# Patient Record
Sex: MALE | URBAN - METROPOLITAN AREA
[De-identification: names, ages, dates, MRNs, and addresses within clinical notes are randomized per-mention and may not be internally consistent; named-entity substitution may affect disease eponyms.]

---

## 2018-01-01 ENCOUNTER — HOSPITAL ENCOUNTER (INPATIENT)
Facility: MEDICAL CENTER | Age: 68
LOS: 1 days | DRG: 871 | End: 2018-05-17
Attending: EMERGENCY MEDICINE | Admitting: INTERNAL MEDICINE
Payer: MEDICARE

## 2018-01-01 ENCOUNTER — APPOINTMENT (OUTPATIENT)
Dept: RADIOLOGY | Facility: MEDICAL CENTER | Age: 68
DRG: 871 | End: 2018-01-01
Attending: EMERGENCY MEDICINE
Payer: MEDICARE

## 2018-01-01 VITALS — BODY MASS INDEX: 22.32 KG/M2 | HEIGHT: 66 IN | TEMPERATURE: 93.7 F | WEIGHT: 138.89 LBS

## 2018-01-01 DIAGNOSIS — E16.2 HYPOGLYCEMIA: ICD-10-CM

## 2018-01-01 DIAGNOSIS — N17.9 ACUTE RENAL FAILURE, UNSPECIFIED ACUTE RENAL FAILURE TYPE (HCC): ICD-10-CM

## 2018-01-01 DIAGNOSIS — D72.825 BANDEMIA: ICD-10-CM

## 2018-01-01 DIAGNOSIS — D68.9 COAGULOPATHY (HCC): ICD-10-CM

## 2018-01-01 DIAGNOSIS — E87.20 METABOLIC ACIDOSIS: ICD-10-CM

## 2018-01-01 DIAGNOSIS — I21.4 NSTEMI (NON-ST ELEVATED MYOCARDIAL INFARCTION) (HCC): ICD-10-CM

## 2018-01-01 DIAGNOSIS — R57.9 SHOCK (HCC): ICD-10-CM

## 2018-01-01 DIAGNOSIS — K92.2 GASTROINTESTINAL HEMORRHAGE, UNSPECIFIED GASTROINTESTINAL HEMORRHAGE TYPE: ICD-10-CM

## 2018-01-01 DIAGNOSIS — E87.20 LACTIC ACIDOSIS: ICD-10-CM

## 2018-01-01 DIAGNOSIS — J96.01 ACUTE RESPIRATORY FAILURE WITH HYPOXIA (HCC): ICD-10-CM

## 2018-01-01 LAB
ABO GROUP BLD: NORMAL
ABO GROUP BLD: NORMAL
ACTION RANGE TRIGGERED IACRT: YES
ACTION RANGE TRIGGERED IACRT: YES
ALBUMIN SERPL BCP-MCNC: 2.5 G/DL (ref 3.2–4.9)
ALBUMIN/GLOB SERPL: 1 G/DL
ALP SERPL-CCNC: 134 U/L (ref 30–99)
ALT SERPL-CCNC: 802 U/L (ref 2–50)
AMMONIA PLAS-SCNC: 239 UMOL/L (ref 11–45)
AMPHET UR QL SCN: NEGATIVE
ANION GAP SERPL CALC-SCNC: 24 MMOL/L (ref 0–11.9)
ANISOCYTOSIS BLD QL SMEAR: ABNORMAL
APAP SERPL-MCNC: <10 UG/ML (ref 10–30)
APPEARANCE UR: ABNORMAL
APTT PPP: 47.9 SEC (ref 24.7–36)
AST SERPL-CCNC: 1713 U/L (ref 12–45)
BACTERIA #/AREA URNS HPF: ABNORMAL /HPF
BARBITURATES UR QL SCN: NEGATIVE
BARCODED ABORH UBTYP: 5100
BARCODED ABORH UBTYP: 6200
BARCODED ABORH UBTYP: 6200
BARCODED PRD CODE UBPRD: NORMAL
BARCODED UNIT NUM UBUNT: NORMAL
BASE EXCESS BLDA CALC-SCNC: -15 MMOL/L (ref -4–3)
BASE EXCESS BLDA CALC-SCNC: -20 MMOL/L (ref -4–3)
BASOPHILS # BLD AUTO: 0.9 % (ref 0–1.8)
BASOPHILS # BLD: 0.21 K/UL (ref 0–0.12)
BENZODIAZ UR QL SCN: NEGATIVE
BILIRUB SERPL-MCNC: 1.6 MG/DL (ref 0.1–1.5)
BILIRUB UR QL STRIP.AUTO: ABNORMAL
BLD GP AB SCN SERPL QL: NORMAL
BODY TEMPERATURE: ABNORMAL DEGREES
BODY TEMPERATURE: ABNORMAL DEGREES
BUN SERPL-MCNC: 47 MG/DL (ref 8–22)
BURR CELLS BLD QL SMEAR: NORMAL
BZE UR QL SCN: NEGATIVE
CALCIUM SERPL-MCNC: 7.3 MG/DL (ref 8.5–10.5)
CANNABINOIDS UR QL SCN: NEGATIVE
CFT BLD TEG: 6.8 MIN (ref 5–10)
CHLORIDE SERPL-SCNC: 96 MMOL/L (ref 96–112)
CLOT ANGLE BLD TEG: 59.2 DEGREES (ref 53–72)
CLOT LYSIS 30M P MA LENFR BLD TEG: 0 % (ref 0–8)
CO2 BLDA-SCNC: 13 MMOL/L (ref 20–33)
CO2 BLDA-SCNC: 15 MMOL/L (ref 20–33)
CO2 SERPL-SCNC: 14 MMOL/L (ref 20–33)
COLOR UR: YELLOW
COMPONENT FT 8504FT: NORMAL
COMPONENT FT 8504FT: NORMAL
COMPONENT R 8504R: NORMAL
CREAT SERPL-MCNC: 2.3 MG/DL (ref 0.5–1.4)
CT.EXTRINSIC BLD ROTEM: 2.3 MIN (ref 1–3)
EKG IMPRESSION: NORMAL
EKG IMPRESSION: NORMAL
EOSINOPHIL # BLD AUTO: 0 K/UL (ref 0–0.51)
EOSINOPHIL NFR BLD: 0 % (ref 0–6.9)
EPI CELLS #/AREA URNS HPF: ABNORMAL /HPF
ERYTHROCYTE [DISTWIDTH] IN BLOOD BY AUTOMATED COUNT: 54.6 FL (ref 35.9–50)
ETHANOL BLD-MCNC: 0 G/DL
GLOBULIN SER CALC-MCNC: 2.5 G/DL (ref 1.9–3.5)
GLUCOSE BLD-MCNC: 170 MG/DL (ref 65–99)
GLUCOSE BLD-MCNC: 211 MG/DL (ref 65–99)
GLUCOSE BLD-MCNC: 41 MG/DL (ref 65–99)
GLUCOSE SERPL-MCNC: 41 MG/DL (ref 65–99)
GLUCOSE UR STRIP.AUTO-MCNC: NEGATIVE MG/DL
HCO3 BLDA-SCNC: 11.2 MMOL/L (ref 17–25)
HCO3 BLDA-SCNC: 13.4 MMOL/L (ref 17–25)
HCT VFR BLD AUTO: 28.2 % (ref 42–52)
HGB BLD-MCNC: 8.7 G/DL (ref 14–18)
HYALINE CASTS #/AREA URNS LPF: ABNORMAL /LPF
INR PPP: 2.68 (ref 0.87–1.13)
INST. QUALIFIED PATIENT IIQPT: YES
INST. QUALIFIED PATIENT IIQPT: YES
KETONES UR STRIP.AUTO-MCNC: ABNORMAL MG/DL
LACTATE BLD-SCNC: 13.7 MMOL/L (ref 0.5–2)
LEUKOCYTE ESTERASE UR QL STRIP.AUTO: NEGATIVE
LIPASE SERPL-CCNC: 154 U/L (ref 11–82)
LYMPHOCYTES # BLD AUTO: 2.41 K/UL (ref 1–4.8)
LYMPHOCYTES NFR BLD: 10.4 % (ref 22–41)
MACROCYTES BLD QL SMEAR: ABNORMAL
MAGNESIUM SERPL-MCNC: 2.5 MG/DL (ref 1.5–2.5)
MANUAL DIFF BLD: ABNORMAL
MCF BLD TEG: 60.2 MM (ref 50–70)
MCH RBC QN AUTO: 28.9 PG (ref 27–33)
MCHC RBC AUTO-ENTMCNC: 30.9 G/DL (ref 33.7–35.3)
MCV RBC AUTO: 93.7 FL (ref 81.4–97.8)
METAMYELOCYTES NFR BLD MANUAL: 0.9 %
METHADONE UR QL SCN: NEGATIVE
MICRO URNS: ABNORMAL
MONOCYTES # BLD AUTO: 1.6 K/UL (ref 0–0.85)
MONOCYTES NFR BLD AUTO: 6.9 % (ref 0–13.4)
MORPHOLOGY BLD-IMP: NORMAL
NEUTROPHILS # BLD AUTO: 18.56 K/UL (ref 1.82–7.42)
NEUTROPHILS NFR BLD: 65.2 % (ref 44–72)
NEUTS BAND NFR BLD MANUAL: 14.8 % (ref 0–10)
NITRITE UR QL STRIP.AUTO: NEGATIVE
NRBC # BLD AUTO: 0.07 K/UL
NRBC BLD-RTO: 0.3 /100 WBC
O2/TOTAL GAS SETTING VFR VENT: 100 %
O2/TOTAL GAS SETTING VFR VENT: 50 %
OPIATES UR QL SCN: NEGATIVE
OXYCODONE UR QL SCN: NEGATIVE
PA AA BLD-ACNC: 97.1 %
PA ADP BLD-ACNC: 99.8 %
PCO2 BLDA: 40 MMHG (ref 26–37)
PCO2 BLDA: 52.6 MMHG (ref 26–37)
PCO2 TEMP ADJ BLDA: 36.2 MMHG (ref 26–37)
PCO2 TEMP ADJ BLDA: 50.8 MMHG (ref 26–37)
PCP UR QL SCN: NEGATIVE
PH BLDA: 6.94 [PH] (ref 7.4–7.5)
PH BLDA: 7.13 [PH] (ref 7.4–7.5)
PH TEMP ADJ BLDA: 6.95 [PH] (ref 7.4–7.5)
PH TEMP ADJ BLDA: 7.16 [PH] (ref 7.4–7.5)
PH UR STRIP.AUTO: 5.5 [PH]
PLATELET # BLD AUTO: 165 K/UL (ref 164–446)
PLATELET BLD QL SMEAR: NORMAL
PMV BLD AUTO: 10.9 FL (ref 9–12.9)
PO2 BLDA: 237 MMHG (ref 64–87)
PO2 BLDA: 375 MMHG (ref 64–87)
PO2 TEMP ADJ BLDA: 226 MMHG (ref 64–87)
PO2 TEMP ADJ BLDA: 371 MMHG (ref 64–87)
POIKILOCYTOSIS BLD QL SMEAR: NORMAL
POTASSIUM SERPL-SCNC: 5.7 MMOL/L (ref 3.6–5.5)
PRODUCT TYPE UPROD: NORMAL
PROMYELOCYTES NFR BLD MANUAL: 0.9 %
PROPOXYPH UR QL SCN: NEGATIVE
PROT SERPL-MCNC: 5 G/DL (ref 6–8.2)
PROT UR QL STRIP: 30 MG/DL
PROTHROMBIN TIME: 28.2 SEC (ref 12–14.6)
RBC # BLD AUTO: 3.01 M/UL (ref 4.7–6.1)
RBC BLD AUTO: PRESENT
RBC UR QL AUTO: ABNORMAL
RH BLD: NORMAL
RH BLD: NORMAL
SALICYLATES SERPL-MCNC: 0 MG/DL (ref 15–25)
SAO2 % BLDA: 100 % (ref 93–99)
SAO2 % BLDA: 100 % (ref 93–99)
SODIUM SERPL-SCNC: 134 MMOL/L (ref 135–145)
SP GR UR REFRACTOMETRY: 1.01
SPECIMEN DRAWN FROM PATIENT: ABNORMAL
SPECIMEN DRAWN FROM PATIENT: ABNORMAL
TEG ALGORITHM TGALG: NORMAL
TROPONIN I SERPL-MCNC: 0.71 NG/ML (ref 0–0.04)
UNIT STATUS USTAT: NORMAL
UROBILINOGEN UR STRIP.AUTO-MCNC: 0.2 MG/DL
WBC # BLD AUTO: 23.2 K/UL (ref 4.8–10.8)

## 2018-01-01 PROCEDURE — 700111 HCHG RX REV CODE 636 W/ 250 OVERRIDE (IP)

## 2018-01-01 PROCEDURE — 85730 THROMBOPLASTIN TIME PARTIAL: CPT

## 2018-01-01 PROCEDURE — 36415 COLL VENOUS BLD VENIPUNCTURE: CPT

## 2018-01-01 PROCEDURE — 85384 FIBRINOGEN ACTIVITY: CPT

## 2018-01-01 PROCEDURE — 83735 ASSAY OF MAGNESIUM: CPT

## 2018-01-01 PROCEDURE — 700111 HCHG RX REV CODE 636 W/ 250 OVERRIDE (IP): Performed by: INTERNAL MEDICINE

## 2018-01-01 PROCEDURE — C1751 CATH, INF, PER/CENT/MIDLINE: HCPCS

## 2018-01-01 PROCEDURE — 36556 INSERT NON-TUNNEL CV CATH: CPT

## 2018-01-01 PROCEDURE — 700105 HCHG RX REV CODE 258: Performed by: EMERGENCY MEDICINE

## 2018-01-01 PROCEDURE — 99292 CRITICAL CARE ADDL 30 MIN: CPT

## 2018-01-01 PROCEDURE — 82962 GLUCOSE BLOOD TEST: CPT | Mod: 91

## 2018-01-01 PROCEDURE — 96368 THER/DIAG CONCURRENT INF: CPT

## 2018-01-01 PROCEDURE — 99291 CRITICAL CARE FIRST HOUR: CPT

## 2018-01-01 PROCEDURE — 86850 RBC ANTIBODY SCREEN: CPT

## 2018-01-01 PROCEDURE — 96375 TX/PRO/DX INJ NEW DRUG ADDON: CPT

## 2018-01-01 PROCEDURE — 86901 BLOOD TYPING SEROLOGIC RH(D): CPT

## 2018-01-01 PROCEDURE — 02HV33Z INSERTION OF INFUSION DEVICE INTO SUPERIOR VENA CAVA, PERCUTANEOUS APPROACH: ICD-10-PCS | Performed by: EMERGENCY MEDICINE

## 2018-01-01 PROCEDURE — 304538 HCHG NG TUBE

## 2018-01-01 PROCEDURE — 700101 HCHG RX REV CODE 250

## 2018-01-01 PROCEDURE — 82803 BLOOD GASES ANY COMBINATION: CPT | Mod: 91

## 2018-01-01 PROCEDURE — 93005 ELECTROCARDIOGRAM TRACING: CPT

## 2018-01-01 PROCEDURE — C9113 INJ PANTOPRAZOLE SODIUM, VIA: HCPCS | Performed by: EMERGENCY MEDICINE

## 2018-01-01 PROCEDURE — 700105 HCHG RX REV CODE 258: Performed by: INTERNAL MEDICINE

## 2018-01-01 PROCEDURE — 96366 THER/PROPH/DIAG IV INF ADDON: CPT

## 2018-01-01 PROCEDURE — P9017 PLASMA 1 DONOR FRZ W/IN 8 HR: HCPCS

## 2018-01-01 PROCEDURE — 5A1935Z RESPIRATORY VENTILATION, LESS THAN 24 CONSECUTIVE HOURS: ICD-10-PCS | Performed by: EMERGENCY MEDICINE

## 2018-01-01 PROCEDURE — 85007 BL SMEAR W/DIFF WBC COUNT: CPT

## 2018-01-01 PROCEDURE — 85576 BLOOD PLATELET AGGREGATION: CPT | Mod: 91

## 2018-01-01 PROCEDURE — 85027 COMPLETE CBC AUTOMATED: CPT

## 2018-01-01 PROCEDURE — 85610 PROTHROMBIN TIME: CPT

## 2018-01-01 PROCEDURE — 37799 UNLISTED PX VASCULAR SURGERY: CPT

## 2018-01-01 PROCEDURE — 81001 URINALYSIS AUTO W/SCOPE: CPT

## 2018-01-01 PROCEDURE — 82140 ASSAY OF AMMONIA: CPT

## 2018-01-01 PROCEDURE — 84484 ASSAY OF TROPONIN QUANT: CPT

## 2018-01-01 PROCEDURE — B548ZZA ULTRASONOGRAPHY OF SUPERIOR VENA CAVA, GUIDANCE: ICD-10-PCS | Performed by: EMERGENCY MEDICINE

## 2018-01-01 PROCEDURE — 700101 HCHG RX REV CODE 250: Performed by: EMERGENCY MEDICINE

## 2018-01-01 PROCEDURE — 303105 HCHG CATHETER EXTRA

## 2018-01-01 PROCEDURE — 94760 N-INVAS EAR/PLS OXIMETRY 1: CPT

## 2018-01-01 PROCEDURE — 700111 HCHG RX REV CODE 636 W/ 250 OVERRIDE (IP): Performed by: EMERGENCY MEDICINE

## 2018-01-01 PROCEDURE — 36620 INSERTION CATHETER ARTERY: CPT

## 2018-01-01 PROCEDURE — 87040 BLOOD CULTURE FOR BACTERIA: CPT | Mod: 91

## 2018-01-01 PROCEDURE — 96367 TX/PROPH/DG ADDL SEQ IV INF: CPT

## 2018-01-01 PROCEDURE — 51702 INSERT TEMP BLADDER CATH: CPT

## 2018-01-01 PROCEDURE — 82962 GLUCOSE BLOOD TEST: CPT

## 2018-01-01 PROCEDURE — 700111 HCHG RX REV CODE 636 W/ 250 OVERRIDE (IP): Mod: JG | Performed by: INTERNAL MEDICINE

## 2018-01-01 PROCEDURE — 80307 DRUG TEST PRSMV CHEM ANLYZR: CPT

## 2018-01-01 PROCEDURE — P9047 ALBUMIN (HUMAN), 25%, 50ML: HCPCS | Mod: JG | Performed by: INTERNAL MEDICINE

## 2018-01-01 PROCEDURE — 96365 THER/PROPH/DIAG IV INF INIT: CPT

## 2018-01-01 PROCEDURE — 36430 TRANSFUSION BLD/BLD COMPNT: CPT

## 2018-01-01 PROCEDURE — 83690 ASSAY OF LIPASE: CPT

## 2018-01-01 PROCEDURE — 74176 CT ABD & PELVIS W/O CONTRAST: CPT

## 2018-01-01 PROCEDURE — 71045 X-RAY EXAM CHEST 1 VIEW: CPT

## 2018-01-01 PROCEDURE — 80053 COMPREHEN METABOLIC PANEL: CPT

## 2018-01-01 PROCEDURE — 30233K1 TRANSFUSION OF NONAUTOLOGOUS FROZEN PLASMA INTO PERIPHERAL VEIN, PERCUTANEOUS APPROACH: ICD-10-PCS | Performed by: EMERGENCY MEDICINE

## 2018-01-01 PROCEDURE — 70450 CT HEAD/BRAIN W/O DYE: CPT

## 2018-01-01 PROCEDURE — 5A12012 PERFORMANCE OF CARDIAC OUTPUT, SINGLE, MANUAL: ICD-10-PCS | Performed by: EMERGENCY MEDICINE

## 2018-01-01 PROCEDURE — 30233N1 TRANSFUSION OF NONAUTOLOGOUS RED BLOOD CELLS INTO PERIPHERAL VEIN, PERCUTANEOUS APPROACH: ICD-10-PCS | Performed by: EMERGENCY MEDICINE

## 2018-01-01 PROCEDURE — 93005 ELECTROCARDIOGRAM TRACING: CPT | Performed by: EMERGENCY MEDICINE

## 2018-01-01 PROCEDURE — 04HY32Z INSERTION OF MONITORING DEVICE INTO LOWER ARTERY, PERCUTANEOUS APPROACH: ICD-10-PCS | Performed by: EMERGENCY MEDICINE

## 2018-01-01 PROCEDURE — P9016 RBC LEUKOCYTES REDUCED: HCPCS

## 2018-01-01 PROCEDURE — 86923 COMPATIBILITY TEST ELECTRIC: CPT | Mod: 91

## 2018-01-01 PROCEDURE — 92950 HEART/LUNG RESUSCITATION CPR: CPT

## 2018-01-01 PROCEDURE — 83605 ASSAY OF LACTIC ACID: CPT

## 2018-01-01 PROCEDURE — 306637 HCHG MISC ORTHO ITEM RC 0274

## 2018-01-01 PROCEDURE — 86900 BLOOD TYPING SEROLOGIC ABO: CPT

## 2018-01-01 PROCEDURE — 85347 COAGULATION TIME ACTIVATED: CPT

## 2018-01-01 PROCEDURE — 94002 VENT MGMT INPAT INIT DAY: CPT

## 2018-01-01 RX ORDER — MORPHINE SULFATE 10 MG/ML
INJECTION, SOLUTION INTRAMUSCULAR; INTRAVENOUS
Status: COMPLETED
Start: 2018-01-01 | End: 2018-01-01

## 2018-01-01 RX ORDER — SODIUM CHLORIDE 9 MG/ML
30 INJECTION, SOLUTION INTRAVENOUS ONCE
Status: COMPLETED | OUTPATIENT
Start: 2018-01-01 | End: 2018-01-01

## 2018-01-01 RX ORDER — ALBUMIN (HUMAN) 12.5 G/50ML
12.5 SOLUTION INTRAVENOUS EVERY 4 HOURS
Status: DISCONTINUED | OUTPATIENT
Start: 2018-01-01 | End: 2018-01-01 | Stop reason: HOSPADM

## 2018-01-01 RX ORDER — CALCIUM CHLORIDE 100 MG/ML
INJECTION INTRAVENOUS; INTRAVENTRICULAR
Status: COMPLETED | OUTPATIENT
Start: 2018-01-01 | End: 2018-01-01

## 2018-01-01 RX ORDER — DEXTROSE MONOHYDRATE 25 G/50ML
25 INJECTION, SOLUTION INTRAVENOUS
Status: DISCONTINUED | OUTPATIENT
Start: 2018-01-01 | End: 2018-01-01 | Stop reason: HOSPADM

## 2018-01-01 RX ORDER — DEXTROSE MONOHYDRATE 25 G/50ML
INJECTION, SOLUTION INTRAVENOUS
Status: COMPLETED
Start: 2018-01-01 | End: 2018-01-01

## 2018-01-01 RX ORDER — IPRATROPIUM BROMIDE AND ALBUTEROL SULFATE 2.5; .5 MG/3ML; MG/3ML
3 SOLUTION RESPIRATORY (INHALATION)
Status: DISCONTINUED | OUTPATIENT
Start: 2018-01-01 | End: 2018-01-01 | Stop reason: HOSPADM

## 2018-01-01 RX ORDER — BISACODYL 10 MG
10 SUPPOSITORY, RECTAL RECTAL
Status: DISCONTINUED | OUTPATIENT
Start: 2018-01-01 | End: 2018-01-01 | Stop reason: HOSPADM

## 2018-01-01 RX ORDER — DEXTROSE MONOHYDRATE 100 MG/ML
INJECTION, SOLUTION INTRAVENOUS CONTINUOUS
Status: DISCONTINUED | OUTPATIENT
Start: 2018-01-01 | End: 2018-01-01 | Stop reason: HOSPADM

## 2018-01-01 RX ORDER — PHENYLEPHRINE HCL IN 0.9% NACL 0.5 MG/5ML
200-300 SYRINGE (ML) INTRAVENOUS
Status: DISCONTINUED | OUTPATIENT
Start: 2018-01-01 | End: 2018-01-01 | Stop reason: HOSPADM

## 2018-01-01 RX ORDER — LACTULOSE 20 G/30ML
30 SOLUTION ORAL ONCE
Status: DISCONTINUED | OUTPATIENT
Start: 2018-01-01 | End: 2018-01-01 | Stop reason: HOSPADM

## 2018-01-01 RX ORDER — MORPHINE SULFATE 10 MG/ML
10 INJECTION, SOLUTION INTRAMUSCULAR; INTRAVENOUS ONCE
Status: COMPLETED | OUTPATIENT
Start: 2018-01-01 | End: 2018-01-01

## 2018-01-01 RX ORDER — DEXTROSE MONOHYDRATE 25 G/50ML
50 INJECTION, SOLUTION INTRAVENOUS ONCE
Status: COMPLETED | OUTPATIENT
Start: 2018-01-01 | End: 2018-01-01

## 2018-01-01 RX ORDER — POLYETHYLENE GLYCOL 3350 17 G/17G
1 POWDER, FOR SOLUTION ORAL
Status: DISCONTINUED | OUTPATIENT
Start: 2018-01-01 | End: 2018-01-01 | Stop reason: HOSPADM

## 2018-01-01 RX ORDER — DEXTROSE MONOHYDRATE 25 G/50ML
INJECTION, SOLUTION INTRAVENOUS
Status: COMPLETED | OUTPATIENT
Start: 2018-01-01 | End: 2018-01-01

## 2018-01-01 RX ORDER — SODIUM CHLORIDE 9 MG/ML
30 INJECTION, SOLUTION INTRAVENOUS
Status: DISCONTINUED | OUTPATIENT
Start: 2018-01-01 | End: 2018-01-01 | Stop reason: HOSPADM

## 2018-01-01 RX ORDER — AMOXICILLIN 250 MG
2 CAPSULE ORAL 2 TIMES DAILY
Status: DISCONTINUED | OUTPATIENT
Start: 2018-01-01 | End: 2018-01-01 | Stop reason: HOSPADM

## 2018-01-01 RX ORDER — CALCIUM CHLORIDE 100 MG/ML
1 INJECTION INTRAVENOUS; INTRAVENTRICULAR ONCE
Status: COMPLETED | OUTPATIENT
Start: 2018-01-01 | End: 2018-01-01

## 2018-01-01 RX ORDER — SODIUM CHLORIDE 9 MG/ML
500 INJECTION, SOLUTION INTRAVENOUS
Status: DISCONTINUED | OUTPATIENT
Start: 2018-01-01 | End: 2018-01-01 | Stop reason: HOSPADM

## 2018-01-01 RX ADMIN — PIPERACILLIN SODIUM AND TAZOBACTAM SODIUM 3.38 G: 3; .375 INJECTION, POWDER, FOR SOLUTION INTRAVENOUS at 23:59

## 2018-01-01 RX ADMIN — SODIUM BICARBONATE 50 MEQ: 84 INJECTION, SOLUTION INTRAVENOUS at 01:00

## 2018-01-01 RX ADMIN — DEXTROSE MONOHYDRATE 50 ML: 25 INJECTION, SOLUTION INTRAVENOUS at 22:35

## 2018-01-01 RX ADMIN — Medication 100 MEQ: at 22:42

## 2018-01-01 RX ADMIN — SODIUM BICARBONATE 50 MEQ: 84 INJECTION, SOLUTION INTRAVENOUS at 01:31

## 2018-01-01 RX ADMIN — NOREPINEPHRINE BITARTRATE 30 MCG/MIN: 1 INJECTION INTRAVENOUS at 01:17

## 2018-01-01 RX ADMIN — CEFTRIAXONE 2 G: 2 INJECTION, POWDER, FOR SOLUTION INTRAMUSCULAR; INTRAVENOUS at 21:30

## 2018-01-01 RX ADMIN — NOREPINEPHRINE BITARTRATE 30 MCG/MIN: 1 INJECTION INTRAVENOUS at 20:55

## 2018-01-01 RX ADMIN — ALBUMIN (HUMAN) 12.5 G: 0.25 INJECTION, SOLUTION INTRAVENOUS at 23:51

## 2018-01-01 RX ADMIN — SODIUM CHLORIDE 8 MG/HR: 9 INJECTION, SOLUTION INTRAVENOUS at 21:33

## 2018-01-01 RX ADMIN — Medication 300 MCG: at 00:55

## 2018-01-01 RX ADMIN — CALCIUM CHLORIDE 1 G: 100 INJECTION, SOLUTION INTRAVENOUS at 01:13

## 2018-01-01 RX ADMIN — OCTREOTIDE ACETATE 50 MCG/HR: 200 INJECTION, SOLUTION INTRAVENOUS; SUBCUTANEOUS at 21:41

## 2018-01-01 RX ADMIN — Medication 200 MCG: at 00:50

## 2018-01-01 RX ADMIN — SODIUM BICARBONATE 150 MEQ: 84 INJECTION, SOLUTION INTRAVENOUS at 00:14

## 2018-01-01 RX ADMIN — DEXTROSE MONOHYDRATE 25 G: 25 INJECTION, SOLUTION INTRAVENOUS at 01:10

## 2018-01-01 RX ADMIN — SODIUM BICARBONATE 100 MEQ: 84 INJECTION, SOLUTION INTRAVENOUS at 22:42

## 2018-01-01 RX ADMIN — HYDROCORTISONE SODIUM SUCCINATE 100 MG: 100 INJECTION, POWDER, FOR SOLUTION INTRAMUSCULAR; INTRAVENOUS at 00:01

## 2018-01-01 RX ADMIN — PHENYLEPHRINE HYDROCHLORIDE 300 MCG/MIN: 10 INJECTION INTRAVENOUS at 00:57

## 2018-01-01 RX ADMIN — MORPHINE SULFATE 10 MG: 10 INJECTION INTRAVENOUS at 01:38

## 2018-01-01 RX ADMIN — VASOPRESSIN 0.03 UNITS/MIN: 20 INJECTION INTRAVENOUS at 21:28

## 2018-01-01 RX ADMIN — SODIUM CHLORIDE 1890 ML: 9 INJECTION, SOLUTION INTRAVENOUS at 22:45

## 2018-01-01 RX ADMIN — MORPHINE SULFATE 10 MG: 10 INJECTION, SOLUTION INTRAMUSCULAR; INTRAVENOUS at 01:38

## 2018-01-01 RX ADMIN — EPINEPHRINE 1 MG: 0.1 INJECTION, SOLUTION ENDOTRACHEAL; INTRACARDIAC; INTRAVENOUS at 01:30

## 2018-01-01 RX ADMIN — EPINEPHRINE 1 MG: 0.1 INJECTION, SOLUTION ENDOTRACHEAL; INTRACARDIAC; INTRAVENOUS at 01:00

## 2018-01-01 RX ADMIN — CALCIUM CHLORIDE 1 G: 100 INJECTION, SOLUTION INTRAVENOUS at 21:29

## 2018-05-16 PROBLEM — J96.02 ACUTE RESPIRATORY FAILURE WITH HYPOXIA AND HYPERCAPNIA (HCC): Status: ACTIVE | Noted: 2018-01-01

## 2018-05-16 PROBLEM — K70.30 ALCOHOLIC CIRRHOSIS OF LIVER WITHOUT ASCITES (HCC): Status: ACTIVE | Noted: 2018-01-01

## 2018-05-16 PROBLEM — R57.9 SHOCK (HCC): Status: ACTIVE | Noted: 2018-01-01

## 2018-05-16 PROBLEM — F10.10 ALCOHOL ABUSE: Status: ACTIVE | Noted: 2018-01-01

## 2018-05-16 PROBLEM — E87.20 METABOLIC ACIDOSIS: Status: ACTIVE | Noted: 2018-01-01

## 2018-05-16 PROBLEM — J96.01 ACUTE RESPIRATORY FAILURE WITH HYPOXIA AND HYPERCAPNIA (HCC): Status: ACTIVE | Noted: 2018-01-01

## 2018-05-16 PROBLEM — R74.8 LIVER ENZYME ELEVATION: Status: ACTIVE | Noted: 2018-01-01

## 2018-05-16 PROBLEM — J43.8 OTHER EMPHYSEMA (HCC): Status: ACTIVE | Noted: 2018-01-01

## 2018-05-16 PROBLEM — K92.2 LOWER GI BLEEDING: Status: ACTIVE | Noted: 2018-01-01

## 2018-05-16 PROBLEM — N17.9 ACUTE RENAL FAILURE (HCC): Status: ACTIVE | Noted: 2018-01-01

## 2018-05-17 NOTE — DISCHARGE PLANNING
SW called to assist with Pt who arrived to ED via [facility] from [facility] in Critical Condition.    SW met with Son Chip Wall ([phone]) in the lobby and took to Pt family room.   Other family arrived.  SW offered support and information as needed.  SW able to get family to bedside where ERP and admitting  Were able to meet with family.    Pt Coded several times  Support given.  TOD 0208  Support and resources given to family.

## 2018-05-17 NOTE — ASSESSMENT & PLAN NOTE
Delta gap 16. Mild metabolic alkalosis also noted.   High lactic acid 14. Osmo pending. No crystal found in urine.

## 2018-05-17 NOTE — H&P
Internal Medicine Admitting History and Physical    Note Author: Charisse Stewart M.D.       Name Cm Wall       1950   Age/Sex 67 y.o. male   MRN [id number]   Code Status Full     After 5PM or if no immediate response to page, please call for cross-coverage  Attending/Team: Dr. Wu/Abimael See Patient List for primary contact information  Call [phone] to page    1st Call - Day Intern (R1):   Vadim 2nd Call - Day Sr. Resident (R2/R3):   Vadim       Chief Complaint:  Transferred from [facility] for GI bleeding with shock.     HPI:  67 year old M with Hx of COPD, active smoking, active alcohol consumption was transferred from [facility]. The patient presented there for multiple episode of bloody/tarry stool/?coffee-ground emesis for days. The patient was weak, and altered, which resulted in intubation for airway protection. Hgb was 7, and got 2unit RBC there.     Upon arrival, the patient was still hypotensive, 2 bid iv, central line, levophed, octreotide, iv PPI started right away. A-line done at right femoral, bp improved after management. His lab showed renal insufficiency, high lactic acid, anemia at 8, low sugar, high K, high AST/ALT and low albumin. CT of head showed no ICH, abd CT showed no ascites but cirrhotic liver.     The patient is admitted under the impression or septic/hypovolemic shock, renal insufficiency, metabolic acidosis, resp failure.     ROS  Limited. Only obtained from transfer note.         Past Medical History:   COPD  Alcohol abuse  ?Overdose or suicidal ideal (family reports he might take some medications from his sister).     Past Surgical History:  No past surgical history on file.    Current Outpatient Medications:  Home Medications    **Home medications have not yet been reviewed for this encounter**     Per chart review, was on prn inhaler for COPD.     Medication Allergy/Sensitivities:  Not on File      Family History:  No family history on  "file.    Social History:  Alcohol  Cigarette.     Physical Exam     Vitals:    05/16/18 2137 05/16/18 2139 05/16/18 2141 05/16/18 2320   Pulse:       Resp:  (!) 21 (!) 24    Temp:       SpO2: (!) 69% 100% 100% 100%   Weight:       Height:         Body mass index is 22.42 kg/m².  Pulse 62   Temp 36.2 °C (97.1 °F)   Resp (!) 24   Ht 1.676 m (5' 6\")   Wt 63 kg (138 lb 14.2 oz)   SpO2 100%   BMI 22.42 kg/m²   O2 therapy: Pulse Oximetry: 100 %, O2 Delivery: Ventilator    Physical Exam  HEENT: 3mm pupils, bilateral, adequate light reflex.   Cardiovascular: Sinus tachy.   Lungs: ON vent, no wheezing.   Abdomen: Soft, No tenderness, no skin lesion, no major surgical scar.   Skin: No erythema, No rash  Lower limbs: normal, no pitting edema   Neurologic: no focal neuro deficit found.   PSY: Cant check.   Some melena/bloody stool noted on clothes.   Other systems also examined, grossly normal.           Data Review       Old Records Request:   Completed  Current Records review/summary: Completed    Lab Data Review:  Recent Results (from the past 24 hour(s))   COD (ADULT)    Collection Time: 05/16/18  9:11 PM   Result Value Ref Range    ABO Grouping Only O     Rh Grouping Only NEG     Antibody Screen-Cod NEG    CBC WITH DIFFERENTIAL    Collection Time: 05/16/18  9:11 PM   Result Value Ref Range    WBC 23.2 (H) 4.8 - 10.8 K/uL    RBC 3.01 (L) 4.70 - 6.10 M/uL    Hemoglobin 8.7 (L) 14.0 - 18.0 g/dL    Hematocrit 28.2 (L) 42.0 - 52.0 %    MCV 93.7 81.4 - 97.8 fL    MCH 28.9 27.0 - 33.0 pg    MCHC 30.9 (L) 33.7 - 35.3 g/dL    RDW 54.6 (H) 35.9 - 50.0 fL    Platelet Count 165 164 - 446 K/uL    MPV 10.9 9.0 - 12.9 fL    Nucleated RBC 0.30 /100 WBC    NRBC (Absolute) 0.07 K/uL    Neutrophils-Polys 65.20 44.00 - 72.00 %    Lymphocytes 10.40 (L) 22.00 - 41.00 %    Monocytes 6.90 0.00 - 13.40 %    Eosinophils 0.00 0.00 - 6.90 %    Basophils 0.90 0.00 - 1.80 %    Neutrophils (Absolute) 18.56 (H) 1.82 - 7.42 K/uL    Lymphs (Absolute) " 2.41 1.00 - 4.80 K/uL    Monos (Absolute) 1.60 (H) 0.00 - 0.85 K/uL    Eos (Absolute) 0.00 0.00 - 0.51 K/uL    Baso (Absolute) 0.21 (H) 0.00 - 0.12 K/uL    Anisocytosis 1+     Macrocytosis 1+    COMP METABOLIC PANEL    Collection Time: 05/16/18  9:11 PM   Result Value Ref Range    Sodium 134 (L) 135 - 145 mmol/L    Potassium 5.7 (H) 3.6 - 5.5 mmol/L    Chloride 96 96 - 112 mmol/L    Co2 14 (L) 20 - 33 mmol/L    Anion Gap 24.0 (H) 0.0 - 11.9    Glucose 41 (LL) 65 - 99 mg/dL    Bun 47 (H) 8 - 22 mg/dL    Creatinine 2.30 (H) 0.50 - 1.40 mg/dL    Calcium 7.3 (L) 8.5 - 10.5 mg/dL    AST(SGOT) 1713 (HH) 12 - 45 U/L    ALT(SGPT) 802 (H) 2 - 50 U/L    Alkaline Phosphatase 134 (H) 30 - 99 U/L    Total Bilirubin 1.6 (H) 0.1 - 1.5 mg/dL    Albumin 2.5 (L) 3.2 - 4.9 g/dL    Total Protein 5.0 (L) 6.0 - 8.2 g/dL    Globulin 2.5 1.9 - 3.5 g/dL    A-G Ratio 1.0 g/dL   LIPASE    Collection Time: 05/16/18  9:11 PM   Result Value Ref Range    Lipase 154 (H) 11 - 82 U/L   TROPONIN    Collection Time: 05/16/18  9:11 PM   Result Value Ref Range    Troponin I 0.71 (H) 0.00 - 0.04 ng/mL   PROTHROMBIN TIME    Collection Time: 05/16/18  9:11 PM   Result Value Ref Range    PT 28.2 (H) 12.0 - 14.6 sec    INR 2.68 (H) 0.87 - 1.13   APTT    Collection Time: 05/16/18  9:11 PM   Result Value Ref Range    APTT 47.9 (H) 24.7 - 36.0 sec   MAGNESIUM    Collection Time: 05/16/18  9:11 PM   Result Value Ref Range    Magnesium 2.5 1.5 - 2.5 mg/dL   AMMONIA    Collection Time: 05/16/18  9:11 PM   Result Value Ref Range    Ammonia 239 (HH) 11 - 45 umol/L   LACTIC ACID    Collection Time: 05/16/18  9:11 PM   Result Value Ref Range    Lactic Acid 13.7 (HH) 0.5 - 2.0 mmol/L   SALICYLATE    Collection Time: 05/16/18  9:11 PM   Result Value Ref Range    Salicylates, Quant. 0 (L) 15 - 25 mg/dL   ACETAMINOPHEN    Collection Time: 05/16/18  9:11 PM   Result Value Ref Range    Acetaminophen -Tylenol <10 10 - 30 ug/mL   DIAGNOSTIC ALCOHOL    Collection Time:  05/16/18  9:11 PM   Result Value Ref Range    Diagnostic Alcohol 0.00 0.00 g/dL   DIFFERENTIAL MANUAL    Collection Time: 05/16/18  9:11 PM   Result Value Ref Range    Bands-Stabs 14.80 (H) 0.00 - 10.00 %    Metamyelocytes 0.90 %    Progranulocytes 0.90 %    Manual Diff Status PERFORMED    PERIPHERAL SMEAR REVIEW    Collection Time: 05/16/18  9:11 PM   Result Value Ref Range    Peripheral Smear Review see below    PLATELET ESTIMATE    Collection Time: 05/16/18  9:11 PM   Result Value Ref Range    Plt Estimation Normal    MORPHOLOGY    Collection Time: 05/16/18  9:11 PM   Result Value Ref Range    RBC Morphology Present     Poikilocytosis 2+     Echinocytes 2+    ESTIMATED GFR    Collection Time: 05/16/18  9:11 PM   Result Value Ref Range    GFR If African American 35 (A) >60 mL/min/1.73 m 2    GFR If Non  29 (A) >60 mL/min/1.73 m 2   UN-XM'D RBC    Collection Time: 05/16/18  9:18 PM   Result Value Ref Range    Component R       R3                  Red Blood Cells3    [id number]   issued       05/16/18   21:00      Product Type Red Blood Cells LR Pheresis     Dispense Status Issued     Unit Number (Barcoded) [id number]     Product Code (Barcoded) R2766T05     Blood Type (Barcoded) 5100    ISTAT ARTERIAL BLOOD GAS    Collection Time: 05/16/18  9:29 PM   Result Value Ref Range    Ph 6.938 (LL) 7.400 - 7.500    Pco2 52.6 (HH) 26.0 - 37.0 mmHg    Po2 375 (H) 64 - 87 mmHg    Tco2 13 (L) 20 - 33 mmol/L    S02 100 (H) 93 - 99 %    Hco3 11.2 (L) 17.0 - 25.0 mmol/L    BE -20 (L) -4 - 3 mmol/L    Body Temp 36.2 C degrees    O2 Therapy 100 %    Ph Temp Cain 6.947 (LL) 7.400 - 7.500    Pco2 Temp Co 50.8 (H) 26.0 - 37.0 mmHg    Po2 Temp Cor 371 (H) 64 - 87 mmHg    Specimen Arterial     Action Range Triggered YES     Inst. Qualified Patient YES    URINE DRUG SCREEN    Collection Time: 05/16/18  9:40 PM   Result Value Ref Range    Amphetamines Urine Negative Negative    Barbiturates Negative Negative     Benzodiazepines Negative Negative    Cocaine Metabolite Negative Negative    Methadone Negative Negative    Opiates Negative Negative    Oxycodone Negative Negative    Phencyclidine -Pcp Negative Negative    Propoxyphene Negative Negative    Cannabinoid Metab Negative Negative   URINALYSIS    Collection Time: 18  9:40 PM   Result Value Ref Range    Micro Urine Req Microscopic     Color Yellow     Character Hazy (A)     Ph 5.5 5.0 - 8.0    Glucose Negative Negative mg/dL    Ketones Trace (A) Negative mg/dL    Protein 30 (A) Negative mg/dL    Bilirubin Small (A) Negative    Urobilinogen, Urine 0.2 Negative    Nitrite Negative Negative    Leukocyte Esterase Negative Negative    Occult Blood Trace (A) Negative   REFRACTOMETER SG    Collection Time: 18  9:40 PM   Result Value Ref Range    Specific Gravity 1.015    URINE MICROSCOPIC (W/UA)    Collection Time: 18  9:40 PM   Result Value Ref Range    Bacteria Rare (A) None /hpf    Epithelial Cells Rare /hpf    Hyaline Cast 0-2 /lpf   FRESH FROZEN PLASMA    Collection Time: 18  9:46 PM   Result Value Ref Range    Component Ft       FPT                 Plasma, Thawed      [id number]   issued       18   23:26      Product Type Plasma  Thawed     Dispense Status Issued     Unit Number (Barcoded) [id number]     Product Code (Barcoded) N1135V79     Blood Type (Barcoded) 6200    EKG (ER)    Collection Time: 18 10:33 PM   Result Value Ref Range    Report       [facility] Emergency Dept.    Test Date:  2018  Pt Name:    CRICKET RAMIRES                Department: ER  MRN:        [id number]                      Room:       [facility]  Gender:     Male                         Technician: RN  :        1950                   Requested By:RHINA REEVES  Order #:    [id number]                    [place] JHAVERI:    Measurements  Intervals                                Axis  Rate:       113                          P:           0  IL:         156                          QRS:        57  QRSD:       88                           T:          144  QT:         332  QTc:        456    Interpretive Statements  SINUS TACHYCARDIA  RSR' IN V1 OR V2, PROBABLY NORMAL VARIANT  NONSPECIFIC T ABNORMALITIES, LATERAL LEADS  No previous ECG available for comparison     ABO AND RH CONFIRMATION    Collection Time: 05/16/18 10:41 PM   Result Value Ref Range    ABO Confirm O     Second Rh Group NEG    ACCU-CHEK GLUCOSE    Collection Time: 05/16/18 10:48 PM   Result Value Ref Range    Glucose - Accu-Ck 170 (H) 65 - 99 mg/dL   ISTAT ARTERIAL BLOOD GAS    Collection Time: 05/16/18 11:17 PM   Result Value Ref Range    Ph 7.133 (LL) 7.400 - 7.500    Pco2 40.0 (H) 26.0 - 37.0 mmHg    Po2 237 (H) 64 - 87 mmHg    Tco2 15 (L) 20 - 33 mmol/L    S02 100 (H) 93 - 99 %    Hco3 13.4 (L) 17.0 - 25.0 mmol/L    BE -15 (L) -4 - 3 mmol/L    Body Temp 34.7 C degrees    O2 Therapy 50 %    Ph Temp Cain 7.163 (LL) 7.400 - 7.500    Pco2 Temp Co 36.2 26.0 - 37.0 mmHg    Po2 Temp Cor 226 (H) 64 - 87 mmHg    Specimen Arterial     Action Range Triggered YES     Inst. Qualified Patient YES        Imaging/Procedures Review:    Independant Imaging Review: Completed  DX-CHEST-PORTABLE (1 VIEW)   Final Result      1.  Supportive tubing as described above.   2.  No pneumonia or pneumothorax.      CT-ABDOMEN-PELVIS W/O   Final Result      1.  Increased small bowel and colonic fluid suggesting gastroenteritis.   2.  Nodular liver consistent with cirrhosis.   3.  Probable LEFT kidney cyst.   4.  No focal mesenteric inflammatory process.      CT-HEAD W/O   Final Result      1.  Diffuse atrophy and white matter changes.   2.  No acute intracranial hemorrhage or territorial infarct.   3.  Prior RIGHT mastoidectomy.   4.  Mucosal thickening in the RIGHT middle ear, likely inflammatory.   5.  Mild chronic paranasal sinus disease.               EKG:   EKG Independant Review: Completed  Sinus tachy, low  "voltage. (bedside ultrasound showed no pericardial effusion).  QTc 456    Records reviewed and summarized in current documentation :  No  [employer] teaching service handout given to patient:  No             Assessment/Plan     Acute renal failure (HCC)   Assessment & Plan    Cr at 2.3. Bun/Cr > 20. No baseline Cr.   No hydronephrosis. Multiple kidney cysts.   Hazy urine noted. Neg nitrite. Neg Leuco esterase.   r/o hyperperfusion pre-renal related. On aggressive hydration now.           Liver enzyme elevation   Assessment & Plan    AST>ALT. Consistent with shock liver.   IVC 1.8cm with minimal collapse during resp cycle, no engorge hepatic vein.           Alcoholic cirrhosis of liver without ascites (HCC)   Assessment & Plan    CT and ultrasound showed some blunting of liver margin. .   No ascites. PLT at 165. INR 2.6.   Cirrhosis is considered. GI consulted.   Upper GI bleeding cant be excluded, low suspicion of varices bleeding.         Metabolic acidosis   Assessment & Plan    Delta gap 16. Mild metabolic alkalosis also noted.   High lactic acid 14. Osmo pending. No crystal found in urine.         Acute respiratory failure with hypoxia (HCC)- (present on admission)   Assessment & Plan    Intubated for low saturation and difficulty protecting airway.   Hx of COPD, no wheezing noted.   RT and prn duoneb. Ventilator support.         Alcohol abuse- (present on admission)   Assessment & Plan    Not sure how much and when was his last drink.   Monitor possible withdrawal.           Other emphysema (HCC)- (present on admission)   Assessment & Plan    No wheezing noted upon admission.   Active smoker per chart review.   RT and prn duoneb.         Lower GI bleeding- (present on admission)   Assessment & Plan    Per note from outside facility. The patient presented with \"I have bloody/loose stool\"   Some melena/hematochezia noted on clothes.   Upper GI bleeding not complete excluded.   IV PPI, octreotide, blood transfusion, " antibiotics.         Shock (HCC)- (present on admission)   Assessment & Plan    r/o septic and hypovolemic shock.   Infection source? Zosyn started after blood culture.   Blood transfusion and fluid challenge started.   BP improved.             Anticipated Hospital stay:  >2 midnights        Quality Measures  Quality-Core Measures   Zosyn (05/16-->)  PPI  Octreotide  SCD  Ventilator.

## 2018-05-17 NOTE — ASSESSMENT & PLAN NOTE
Not sure how much and when was his last drink.   Multiple coding (mostly PEA) noted at ED.   Patient passed away at ED before moving to ICU.

## 2018-05-17 NOTE — ASSESSMENT & PLAN NOTE
Intubated for low saturation and difficulty protecting airway.   Hx of COPD, no wheezing noted.   RT and prn duoneb. Ventilator support.   Multiple coding (mostly PEA) noted at ED.   Patient passed away at ED before moving to ICU.

## 2018-05-17 NOTE — ED NOTES
Two units FFP complete. BP decreasing from 80's to 50's systolic. Pharmacy and admitting notified.

## 2018-05-17 NOTE — ED NOTES
from Lab called with critical result of Glucose 41, AST 1,713, and ammonia 239 at 2232. Critical lab result read back to .   Dr. Austin notified of critical lab result at 2233.  Critical lab result read back by Dr. Austin.

## 2018-05-17 NOTE — ASSESSMENT & PLAN NOTE
Cr at 2.3. Bun/Cr > 20. No baseline Cr.   No hydronephrosis. Multiple kidney cysts.   Hazy urine noted. Neg nitrite. Neg Leuco esterase.   r/o hyperperfusion pre-renal related. s/p aggressive fluid management.   Patient passed away at ED before moving to ICU.

## 2018-05-17 NOTE — ED NOTES
Chief Complaint   Patient presents with   • Lower GI Bleed     transfer from [facility] for GI bleed and septic shock    • Blood Infection     BIB [facility] for above. Reports pt c/o melena x3 days. Arrived to facility and decompensated at approx 1515. Pt was intubated and transferred out.   H/H: 8.2/24.3. Received 1 unit RBCs PTA. Also received 3L NS PTA.  BP on arrival 60/38 on norepi. ERP at bedside.

## 2018-05-17 NOTE — ASSESSMENT & PLAN NOTE
CT and ultrasound showed some blunting of liver margin. .   No ascites. PLT at 165. INR 2.6.   Cirrhosis is considered. GI consulted. No intervention at this time due to unstable vitals.   Multiple coding (mostly PEA) noted at ED.   Patient passed away at ED before moving to ICU.

## 2018-05-17 NOTE — ASSESSMENT & PLAN NOTE
AST>ALT. Consistent with shock liver.   IVC 1.8cm with minimal collapse during resp cycle, no engorge hepatic vein.

## 2018-05-17 NOTE — ED NOTES
[facility] office has declined to take the pt. All lines and drains have been discontinued at this time. No personal property has been identified.

## 2018-05-17 NOTE — ASSESSMENT & PLAN NOTE
r/o septic and hypovolemic shock.   Infection source? Zosyn started after blood culture.   Blood transfusion and fluid challenge started.   BP improved.
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
## 2018-05-17 NOTE — CONSULTS
Critical Care/Pulmonary Consultation    Date of service: 5/16/2018    Consulting Physician: De Austin M.D.    Chief Complaint: Coffee-ground emesis, hypotension, respiratory failure    History of Present Illness: I was called emergently to the ED to evaluate this patient who is intubated on full ventilator support and multiple vasopressors.  All history is obtained from discussion with healthcare providers as well as discussion with the patient's son and daughter who just arrived at bedside.  He is a 67-year-old male with a history of alcoholism and known cirrhosis.  He lives in [facility] [place] with a girlfriend.  Apparently he was admitted to [facility] several years ago and family was told that he would not survive due to his severe cirrhosis at that time.  His son and daughter become somewhat estranged over the past couple years.  They report that he likely is continued to drink heavily and will take any pills that he can get his hands on.  He presented to the hospital and [facility] with complaints of coffee-ground emesis, melena and altered mental status.  He was intubated there.  He was anemic with a hemoglobin of 7 and received 2 units of packed red blood cells and 2 L of crystalloid.  On arrival here he is receiving norepinephrine.  Central venous catheter and arterial catheter were placed.  He is now on high-dose norepinephrine and vasopressin.  He is markedly acidemic with a pH of 6.9, PCO2 52, PaO2 375.  He is received bicarb and ventilator settings have been increased.  He is hyperglycemic and is receiving D50.  Is started on Protonix and octreotide infusions.  He is receiving FFP and TEG is pending.  Have added IV albumin and sodium bicarbonate infusion.  Patient received broad-spectrum antibiotics in the ED and cultures have been sent.  Overall prognosis is extremely poor. Rose DF = 76 (PT 28.3, INR 2.68, T. Bili 1.6), ammonia 239.  Given 100 mg of  "Solu-Cortef and 50 every 6 subsequently    Review of Systems   Unable to perform ROS: Acuity of condition       No current facility-administered medications on file prior to encounter.      No current outpatient prescriptions on file prior to encounter.       Social History   Substance Use Topics   • Smoking status: Not on file   • Smokeless tobacco: Not on file   • Alcohol use Not on file        No past medical history on file.    No past surgical history on file.    Allergies: Patient has no allergy information on record.    No family history on file.    Vitals:    05/16/18 2055 05/16/18 2059 05/16/18 2100 05/16/18 2118   Height: 1.676 m (5' 6\")      Weight: 63 kg (138 lb 14.2 oz)      Weight % change since last entry.: 0 %      Pulse:  62     BMI (Calculated): 22.42      Resp:    (!) 24   Temp:   36.2 °C (97.1 °F)     05/16/18 2125 05/16/18 2130 05/16/18 2135 05/16/18 2139   Height:       Weight:       Weight % change since last entry.:       Pulse:       BMI (Calculated):       Resp: 16 18 (!) 24 (!) 21   Temp:        05/16/18 2141   Height:    Weight:    Weight % change since last entry.:    Pulse:    BMI (Calculated):    Resp: (!) 24   Temp:        Physical Examination  Patient examined in detail, see house staff note    Recent Labs      05/16/18 2111   WBC  23.2*   NEUTSPOLYS  65.20   LYMPHOCYTES  10.40*   MONOCYTES  6.90   EOSINOPHILS  0.00   BASOPHILS  0.90   ASTSGOT  1713*   ALTSGPT  802*   ALKPHOSPHAT  134*   TBILIRUBIN  1.6*     Recent Labs      05/16/18 2111   SODIUM  134*   POTASSIUM  5.7*   CHLORIDE  96   CO2  14*   BUN  47*   CREATININE  2.30*   MAGNESIUM  2.5   CALCIUM  7.3*     Recent Labs      05/16/18 2111   ALTSGPT  802*   ASTSGOT  1713*   ALKPHOSPHAT  134*   TBILIRUBIN  1.6*   LIPASE  154*   GLUCOSE  41*     Recent Labs      05/16/18 2129   ISTATAPH  6.938*   ISTATAPCO2  52.6*   ISTATAPO2  375*   ISTATATCO2  13*   [id number]  100*   ISTATARTHCO3  11.2*   ISTATARTBE  -20*   ISTATTEMP  " 36.2 C   ISTATFIO2  100   ISTATSPEC  Arterial   ISTATAPHTC  6.947*   KRAPHOZV5EY  371*     DX-CHEST-PORTABLE (1 VIEW)   Final Result      1.  Supportive tubing as described above.   2.  No pneumonia or pneumothorax.      CT-ABDOMEN-PELVIS W/O   Final Result      1.  Increased small bowel and colonic fluid suggesting gastroenteritis.   2.  Nodular liver consistent with cirrhosis.   3.  Probable LEFT kidney cyst.   4.  No focal mesenteric inflammatory process.      CT-HEAD W/O   Final Result      1.  Diffuse atrophy and white matter changes.   2.  No acute intracranial hemorrhage or territorial infarct.   3.  Prior RIGHT mastoidectomy.   4.  Mucosal thickening in the RIGHT middle ear, likely inflammatory.   5.  Mild chronic paranasal sinus disease.          Assessment and Plan:  Acute hypoxemic respiratory failure   -Intubated prior to arrival for her altered mental status and multiorgan failure   -Full ventilator support, ventilator adjustments made with marked increase in minute ventilation for severe acidosis  Hypotension/shock   -Suspect combined sepsis, GI bleed, intravascular volume depletion   -Crystalloid volume resuscitation, packed red blood cells, platelets, FFP, albumin   -Broad-spectrum antibiotics, follow cultures   -Titrated vasopressors  GI bleed   -Hematemesis and melena   -Octreotide, Protonix   -Correct coagulopathy as above   -GI contacted, ongoing resuscitation with EGD if the patient survives in the short-term  Alcoholic cirrhosis  Hypoglycemia, related to liver disease   -D50 and D5/D10 drip initiated  Ongoing alcohol use  Coagulopathy  Marked metabolic acidosis, lactic acid 14   -Receive sodium bicarb and bicarb infusion started target pH greater than 7.2  COPD/ongoing tobacco use  I had a long discussion with patient's family at bedside as well as another daughter by phone.  His chance of mortality is extremely high and I do not expect him to survive this event.  They are considering CODE  STATUS but would like aggressive measures at this time and his daughter will try to get her from [place] tomorrow.  The patient remains critically ill.  Critical care time = 90 minutes in directly providing and coordinating critical care and extensive data review.  No time overlap and excludes procedures.

## 2018-05-17 NOTE — ASSESSMENT & PLAN NOTE
"Per note from outside facility. The patient presented with \"I have bloody/loose stool\"   Some melena/hematochezia noted on clothes.   Upper GI bleeding not complete excluded.   IV PPI, octreotide, blood transfusion, antibiotics.   "

## 2018-05-17 NOTE — PROGRESS NOTES
CODE BLUE called.  The patient had a prior PA arrest and hypoglycemia was noted.  He had one round of chest compressions, bicarbonate, epinephrine, D50.  He is on 3 vasopressors, sodium bicarbonate infusion octreotide, Protonix and receiving albumin.  Repeat PEA arrest occurred and chest compressions initiated.  Sodium bicarbonate given with epinephrine.  Family was outside the room and asked the resuscitative efforts be discontinued.  He did achieve ROSC again.  I discussed options with him.  I have given 1 dose of morphine and they do not want any further resuscitation.  He remains on full ventilator support but I discontinue vasopressors and I expect he will pass rapidly.  I have answered their questions and social work is at bedside to provide additional support.

## 2018-05-17 NOTE — ED PROVIDER NOTES
"CHIEF COMPLAINT  Chief Complaint   Patient presents with   • Lower GI Bleed     transfer from [facility] for GI bleed and septic shock    • Blood Infection       HPI    I was called emergently to this patient's bedside.  Cm Wall is a 67 y.o. male who presents to the ED as a transfer from [facility] for evaluation of a GI bleed and altered mental status. Patient was found to be hypotensive upon initial evaluation in [place]. He was given 2 units of packed cells and IV antibiotics. The patient was additionally placed on multiple pressors, intubated, given blood products as well as octreotide, antibiotics, and proton pump inhibitor drip.  His blood pressure has continued to drop despite intervention with medication and code was called when patient's blood pressure was found to be in the 50's systolic. Further HPI cannot be obtained from the patient secondary to their unresponsive state.  The patient was initially seen by Dr. Austin in the emergency department, resuscitated and then admitted to the ICU, care of the patient was assumed by the ICU team, however he acutely decompensated while still in the ER and my presence was requested as I was the closest available physician to help care for this critically ill patient while awaiting bed placement in the ICU.    REVIEW OF SYSTEMS  See HPI for further details. Review of systems cannot be obtained secondary to the patient's unresponsive state.  C.    PAST MEDICAL HISTORY   GI bleed, alcohol abuse    SOCIAL HISTORY  Social History: History of alcohol abuse     SURGICAL HISTORY  Unknown    CURRENT MEDICATIONS  Home Medications    **Home medications have not yet been reviewed for this encounter**         ALLERGIES  Not on File    PHYSICAL EXAM  VITAL SIGNS: BP (!) 88/35   Pulse (!) 120   Temp (!) 34.3 °C (93.7 °F)   Resp (!) 28   Ht 1.676 m (5' 6\")   Wt 63 kg (138 lb 14.2 oz)   SpO2 97%   BMI 22.42 kg/m²   Constitutional: Unresponsive and " intubated.  HENT: No signs of trauma, ET tube in place, orogastric tube with blood coming out to intermittent suction.  Eyes: Pupils are fixed at 4 mm.  Neck: Trachea midline, bulging neck veins.  Lymphatic: No lymphadenopathy noted.   Cardiovascular: Distant heart sounds, thready femoral pulses.  Thorax & Lungs: Coarse breath sounds through mechanical ventilation, no spontaneous breathing.  Abdomen: Mild distention, slightly firm.  Skin: Cold to touch, pale.   Musculoskeletal: No obvious swelling or long bone deformities per  Neurologic: Unresponsive, GCS 3.  Psychiatric: Cannot be evaluated due to unresponsive state.    COURSE & MEDICAL DECISION MAKING      12:59 AM - Called acutely to patient's bedside, CODE BLUE. Patient found to be hypotensive with systolic in the 50's.  No palpable pulses, CPR initiated on my arrival. 1 mg of epinephrine administered.     1:00 AM - 50 mg of Bicarb administered.    1:01 AM - Pulse check.  Pulses are present.  Sinus Bradycardia. Pertinent Labs & Imaging studies reviewed. (See chart for details)    1:04 AM - Spoke with son and daughter of patient at the bedside. Informed patient's blood pressure is extremely low and there is a concern for blood flow to patient's brain. Informed patient doesn't appear to be responding to aggressive life saving measures and may likely be dying. Discussed further life sustaining measures but son and daughter informed me the patient's power of  is not present.  Will attempt to contact his daughter Laila, power of , by phone.    1:10 AM - 50 mg of Bicarb administered.  Glucose significantly low on bedside assessment a 41, amp of D50 administered through central line.  The patient has received multiple blood products, thus we will also trial a dose of calcium chloride.  He is on high doses of norepinephrine and phenylephrine, and after glucose administration and calcium his heart rate and blood pressure are improving.  Labs quickly  reviewed, severe hepatic injury, coagulopathy, and profound metabolic acidosis.    1:20 AM -I had a long discussion with the patient's daughter, Laila, over the phone who lives in [facility].  She understands after conversations with the admitting team that her father is very ill and may die, despite maximal medical therapy at this time.  She still wishes for all aggressive measures to be taken as she would like to try and be by his bedside so that she may say goodbye.    1:35 AM -ICU attending back to the patient's bedside and resuming care of patient.  The patient continued to unfortunately declined.  Repeat code, family requested discontinuation of aggressive efforts.    2:06 AM - Nurse alerted me patient's monitor shows no sign of life. Evaluated at bedside, no palpable pulse. Monitor strip reviewed with no cardiac activity. No heart sounds. On cardiac examination, pupils fixed. Time of death declared by myself at 2:08 AM.  Family at the bedside was notified of the patient's death.    Upon my evaluation, this patient had a high probability of imminent or life-threatening deterioration due to multiorgan failure with concomitant GI bleed and severe metabolic acidosis, acute respiratory failure with hypoxia.     I personally provided 40 minutes of total critical care time outside of time spent on separately billable/documented procedures. This required my direct attention, intervention, and management which included the following:  -review of laboratory data  -discussion with consultant: intensivist  -discussions with family of patient  -monitoring for potential decompensation  -ACLS measures, calcium administration, epinephrine administration, bicarb administration, glucose administration    FINAL IMPRESSION  1. Shock (HCC)    2. Gastrointestinal hemorrhage, unspecified gastrointestinal hemorrhage type    3. Multi-organ failure with liver failure (HCC)    4. Acute respiratory failure with hypoxia  (HCC)    5. Metabolic acidosis    6. Acute renal failure, unspecified acute renal failure type (HCC)    7. Hypoglycemia    8. Lactic acidosis    9. Coagulopathy (HCC)    10. Bandemia    11. NSTEMI (non-ST elevated myocardial infarction) ([facility])      --    Electronically signed by Abrahan Stephenson on 2018 at 5:19 AM.

## 2018-05-21 NOTE — DOCUMENTATION QUERY
DOCUMENTATION QUERY    PROVIDERS: Please select “Cosign w/ note”to reply to query.    To better represent the severity of illness of your patient, please review the following information and exercise your independent professional judgment in responding to this query.     Altered mental status is documented in the History and Physical and ED provider notes. Based upon the clinical findings, risk factors, and treatment, can a diagnosis be provided to support this finding?    • Acute encephalopathy (if so, please specify type [metabolic, hepatic, toxic, alcoholic, etc.])  • Psychosis (if so, please specify type [acute hysterical, alcoholic, etc.])  • Delirium (if so, please specify underlying cause [alcohol intoxication, alcohol withdrawal, multiple etiologies, unknown etiology])   • Other explanation of clinical findings  • Unable to determine    The medical record reflects the following:   Clinical Findings Pulmonary consult:   · Altered mental status  · Intubated prior to arrival for altered mental status and multiorgan failure     H&P: The patient was weak, and altered, which resulted in intubation for airway protection    5/17 ED provider note:  · Unresponsive and intubated  · Unresponsive, GCS 3    5/16 ED provider note:   · presents for altered mental status shock GI bleed  · He was intubated for airway protection and mental status decline  · On arrival he is obtunded and hypotensive   · GCS 3T obtunded   Treatment Mechanical intubation  CT head  PBRCs   Risk Factors Alcoholic cirrhosis  Septic shock  Hemorrhagic/hypovolemic shock  Acute hypoxic respiratory failure   Acute lower GI bleeding  Acute liver failure   Location within medical record History and Physical, Progress Notes and ED provider notes     Thank you,   Re Robbins, RN, BSN  Clinical   [phone]

## 2018-05-22 LAB
BACTERIA BLD CULT: NORMAL
BACTERIA BLD CULT: NORMAL
SIGNIFICANT IND 70042: NORMAL
SIGNIFICANT IND 70042: NORMAL
SITE SITE: NORMAL
SITE SITE: NORMAL
SOURCE SOURCE: NORMAL
SOURCE SOURCE: NORMAL

## 2018-05-30 NOTE — DISCHARGE SUMMARY
Internal Medicine Death Summary  Note Author: Charisse Stewart M.D.       Admit Date:  5/16/2018       Date of Death:   05/17 2018    Service:   [facility] Team  Attending Physician(s):   Dr. Wu       Senior Resident(s):   Stewart  Reese Resident(s):   Stewart      Cause of Death:   Gastrointestinal bleeding  Liver cirrhosis  Acute respiratory failure     Diagnoses:            Active Problems:    Shock (HCC) POA: Yes    Lower GI bleeding POA: Yes    Other emphysema (HCC) POA: Yes    Alcohol abuse POA: Yes    Acute respiratory failure with hypoxia (HCC) POA: Yes    Metabolic acidosis POA: Unknown    Alcoholic cirrhosis of liver without ascites (HCC) POA: Unknown    Liver enzyme elevation POA: Unknown    Acute renal failure (HCC) POA: Unknown  Resolved Problems:    * No resolved hospital problems. *      Hospital Summary (Brief Narrative):       The patient was seen at ED by this ICU team. Due to ongoing GI bleeding, unstable vitals signs, acute resp failure, changed of consciousness, this ICU team recommended comfort care to the family. Mylo blood transfusion with RBC, PLT, FFP were given. PPI and octreotide also started, however, his bleeding was not stopped. GI was consulted, not able to due intervention due to extremely unstable vital signs. Tthe patient had been coded multiple times at ED. Around midnight of 05/16-05/17, family member decided to stop further coding, the patient passed away at 2:08am on 05/17 2018.       Patient /Hospital Summary (Details -- Problem Oriented) :          Acute renal failure (HCC)   Assessment & Plan    Cr at 2.3. Bun/Cr > 20. No baseline Cr.   No hydronephrosis. Multiple kidney cysts.   Hazy urine noted. Neg nitrite. Neg Leuco esterase.   r/o hyperperfusion pre-renal related. s/p aggressive fluid management.   Patient passed away at ED before moving to ICU.         Liver enzyme elevation   Assessment & Plan    AST>ALT. Consistent with shock liver.   IVC 1.8cm  "with minimal collapse during resp cycle, no engorge hepatic vein.           Alcoholic cirrhosis of liver without ascites (HCC)   Assessment & Plan    CT and ultrasound showed some blunting of liver margin. .   No ascites. PLT at 165. INR 2.6.   Cirrhosis is considered. GI consulted. No intervention at this time due to unstable vitals.   Multiple coding (mostly PEA) noted at ED.   Patient passed away at ED before moving to ICU.         Metabolic acidosis   Assessment & Plan    Delta gap 16. Mild metabolic alkalosis also noted.   High lactic acid 14.  No crystal found in urine.         Acute respiratory failure with hypoxia (HCC)   Assessment & Plan    Intubated for low saturation and difficulty protecting airway.   Hx of COPD, no wheezing noted.   RT and prn duoneb. Ventilator support.   Multiple coding (mostly PEA) noted at ED.   Patient passed away at ED before moving to ICU.         Alcohol abuse   Assessment & Plan    Not sure how much and when was his last drink.   Multiple coding (mostly PEA) noted at ED.   Patient passed away at ED before moving to ICU.              Upper and Lower GI bleeding   Assessment & Plan    Per note from outside facility. The patient presented with \"I have bloody/loose stool\"   Large amount of coffee ground drained from NG tube.   Some melena/hematochezia noted on clothes.   IV PPI, octreotide, blood transfusion, antibiotics.   Multiple coding (mostly PEA) noted at ED.   Patient passed away at ED before moving to ICU.                Consultants:     GI team.   Critical care.     Procedures:        Intubation by outside hospital  Central line by ED team     Imaging/ Testin/16 CT head  1.  Diffuse atrophy and white matter changes.  2.  No acute intracranial hemorrhage or territorial infarct.  3.  Prior RIGHT mastoidectomy.  4.  Mucosal thickening in the RIGHT middle ear, likely inflammatory.  5.  Mild chronic paranasal sinus disease.     CT abd  1.  Increased small bowel " and colonic fluid suggesting gastroenteritis.  2.  Nodular liver consistent with cirrhosis.  3.  Probable LEFT kidney cyst.  4.  No focal mesenteric inflammatory process.    05/16 CXR  1.  Supportive tubing as described above.  2.  No pneumonia or pneumothorax.